# Patient Record
Sex: FEMALE | Race: OTHER | NOT HISPANIC OR LATINO
[De-identification: names, ages, dates, MRNs, and addresses within clinical notes are randomized per-mention and may not be internally consistent; named-entity substitution may affect disease eponyms.]

---

## 2023-08-15 ENCOUNTER — NON-APPOINTMENT (OUTPATIENT)
Age: 28
End: 2023-08-15

## 2023-08-15 ENCOUNTER — APPOINTMENT (OUTPATIENT)
Dept: GYNECOLOGIC ONCOLOGY | Facility: CLINIC | Age: 28
End: 2023-08-15
Payer: COMMERCIAL

## 2023-08-15 VITALS
SYSTOLIC BLOOD PRESSURE: 116 MMHG | HEIGHT: 69 IN | BODY MASS INDEX: 19.4 KG/M2 | WEIGHT: 131 LBS | DIASTOLIC BLOOD PRESSURE: 79 MMHG | HEART RATE: 82 BPM | TEMPERATURE: 97.8 F | OXYGEN SATURATION: 99 %

## 2023-08-15 DIAGNOSIS — D21.9 BENIGN NEOPLASM OF CONNECTIVE AND OTHER SOFT TISSUE, UNSPECIFIED: ICD-10-CM

## 2023-08-15 PROBLEM — Z00.00 ENCOUNTER FOR PREVENTIVE HEALTH EXAMINATION: Status: ACTIVE | Noted: 2023-08-15

## 2023-08-15 PROCEDURE — 99205 OFFICE O/P NEW HI 60 MIN: CPT

## 2023-08-15 NOTE — ASSESSMENT
[FreeTextEntry1] : Imaging studies and pathology report reviewed in detail. We discussed leiomyoma, atypical leiomyoma, STUMP and sarcomas. Characteristics of each explained.   I would not recommend a myomectomy at this time for oncologic reasons. I explained that in cases concerning for a sarcoma, hysterectomy is recommended. In the absence of a hysterectomy, I typically recommend close observation in this setting. I will first request the pathology slides from her hysteroscopic myomectomy and if no concern for STUMP or sarcoma, I recommend repeat US in 6 months.  Recommend that she follow up with Dr. Burns for management of menorrhagia. Medical management options briefly discussed. In addition, I will refer her to hematology for bleeding work up.   [] Pathology review [] Hematology Referral [] Repeat US in 6 months [] Recommend continuous OCPs or Mirena IUD  Reconsult as needed

## 2023-08-15 NOTE — HISTORY OF PRESENT ILLNESS
[FreeTextEntry1] : Problem:   Previous Therapies: 1) TVUS 7/11/23     a) Uterus 7.8 x 6.1 x 5.8cm with Mid fundal anterior intramural fibroid 3.1 x 2.9 x 2.9cm     b) Trace fluid in upper endometrium 2mm in thickness     c) EMS 0.8cm     d) RO 2.7cm; LO 2.2cm  2) Hysteroscopic myomectomy     a) Smooth muscle-scattered enlarged hyperchromatic, no mitotic activity w/ bizarre nuclei

## 2023-08-15 NOTE — PHYSICAL EXAM
[Abnormal] : Uterus: Abnormal [Normal] : Anus and perineum: Normal sphincter tone, no masses, no prolapse. [de-identified] : 7cm uterus, 2 cm irregular mass anterior uterus

## 2023-08-15 NOTE — CHIEF COMPLAINT
[FreeTextEntry1] : New Consult  29yo referred by Dr. Burns for h/o atypical fibroid. Pt has h/o menorrhagia leading to anemia, no IV iron or blood transfusions in 2017. At that time, she had a myosure resection of intracavitary fibroid which improved the vb. Recently she has been having heavy menses in past 3-4 months- ie. today on menses and used 2 super tampons and a pad this morning (this is her heaviest day). + cramps. Because of the increase vb she got an US and which shows the fibroid came back, similar size as in 2017.  PAP: NEG, HPV Neg 7/3/23 OBHX: none GYNHX: regular menses, on OCP,  h/o menorrhagia causing anemia s/p myosure resection of intracavitary fibroid 2017 c/w atypical myoma in [place]. denies abnormal paps.  PMHX: none SX: Hysteroscopic myomectomy 2017  MED: OCP- [facility] ALL: NKDA  SOCIAL: no toxic habits FAM: none

## 2023-08-25 ENCOUNTER — RESULT REVIEW (OUTPATIENT)
Age: 28
End: 2023-08-25

## 2023-08-25 ENCOUNTER — OUTPATIENT (OUTPATIENT)
Dept: OUTPATIENT SERVICES | Facility: HOSPITAL | Age: 28
LOS: 1 days | End: 2023-08-25
Payer: COMMERCIAL

## 2023-08-25 DIAGNOSIS — D21.9 BENIGN NEOPLASM OF CONNECTIVE AND OTHER SOFT TISSUE, UNSPECIFIED: ICD-10-CM

## 2023-08-25 PROCEDURE — 88321 CONSLTJ&REPRT SLD PREP ELSWR: CPT

## 2023-08-26 LAB
SURGICAL PATHOLOGY STUDY: SIGNIFICANT CHANGE UP

## 2024-09-30 ENCOUNTER — NON-APPOINTMENT (OUTPATIENT)
Age: 29
End: 2024-09-30

## 2024-10-01 ENCOUNTER — NON-APPOINTMENT (OUTPATIENT)
Age: 29
End: 2024-10-01

## 2024-10-01 ENCOUNTER — APPOINTMENT (OUTPATIENT)
Dept: GYNECOLOGIC ONCOLOGY | Facility: CLINIC | Age: 29
End: 2024-10-01
Payer: COMMERCIAL

## 2024-10-01 VITALS
OXYGEN SATURATION: 98 % | HEART RATE: 105 BPM | DIASTOLIC BLOOD PRESSURE: 85 MMHG | HEIGHT: 69 IN | BODY MASS INDEX: 19.26 KG/M2 | TEMPERATURE: 98 F | SYSTOLIC BLOOD PRESSURE: 125 MMHG | WEIGHT: 130 LBS

## 2024-10-01 DIAGNOSIS — R87.810 CERVICAL HIGH RISK HUMAN PAPILLOMAVIRUS (HPV) DNA TEST POSITIVE: ICD-10-CM

## 2024-10-01 LAB
HCG UR QL: NEGATIVE
QUALITY CONTROL: YES

## 2024-10-01 PROCEDURE — 81025 URINE PREGNANCY TEST: CPT

## 2024-10-01 PROCEDURE — 57454 BX/CURETT OF CERVIX W/SCOPE: CPT

## 2024-10-01 NOTE — CHIEF COMPLAINT
[FreeTextEntry1] : New Consult  27yo referred by Dr. Burns for h/o atypical fibroid. Pt has h/o menorrhagia leading to anemia, no IV iron or blood transfusions in 2017. At that time, she had a myosure resection of intracavitary fibroid which improved the vb. Recently she has been having heavy menses in past 3-4 months- ie. today on menses and used 2 super tampons and a pad this morning (this is her heaviest day). + cramps. Because of the increase vb she got an US and which shows the fibroid came back, similar size as in 2017.  PAP: NEG, HPV Neg 7/3/23 OBHX: none GYNHX: regular menses, on OCP,  h/o menorrhagia causing anemia s/p myosure resection of intracavitary fibroid 2017 c/w atypical myoma in [place]. denies abnormal paps.  PMHX: none SX: Hysteroscopic myomectomy 2017  MED: OCP- [facility] ALL: NKDA  SOCIAL: no toxic habits FAM: none

## 2024-10-01 NOTE — ASSESSMENT
[FreeTextEntry1] : ASCCP guidelines discussed  Patient tolerated procedure well. 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  [] ECC [] Cervical bx @ 11   Plan pending results

## 2024-10-01 NOTE — PROCEDURE
[Colposcopy] : colposcopy [HPV high risk] : PCR positive for high risk HPV [Patient] : the patient [Verbal Consent] : verbal consent was obtained prior to the procedure and is detailed in the patient's record [Cervical Pap Performed] : a cervical pap smear was not performed [Vaginal Pap Performed] : no vaginal pap smear was performed [SCJ Fully Visualized] : the squamocolumnar junction was fully visualized [No Abnormalities] : no abnormalities [Acetowhite ___ o'clock] : ascetowhite changes at the [unfilled] ~Uo'clock position [Biopsies Taken: # ___] : [unfilled] biopsies taken of the cervix [ECC Done] : an Endocervical curettage was performed.  [Silver Nitrate] : silver nitrate [No Complications] : none [de-identified] : uCG negative

## 2024-10-01 NOTE — REASON FOR VISIT
[FreeTextEntry1] : Follow-up  30yo re-referred by Dr. Burns for colposcopy for HPV 18+, PAP neg. LMP: on OCP and skipped the placebo. Denies dyspareunia, AUB, changes urinary or bowel habits.   OBHX: none GYNHX: regular menses, on OCP, h/o menorrhagia causing anemia s/p myosure resection of intracavitary fibroid 2017 c/w atypical myoma in [place]. denies abnormal paps.  PMHX: none SX: Hysteroscopic myomectomy 2017  MED: OCP- [facility] ALL: NKDA  SOCIAL: no toxic habits FAM: none

## 2024-10-01 NOTE — PROCEDURE
[Colposcopy] : colposcopy [HPV high risk] : PCR positive for high risk HPV [Patient] : the patient [Verbal Consent] : verbal consent was obtained prior to the procedure and is detailed in the patient's record [Cervical Pap Performed] : a cervical pap smear was not performed [Vaginal Pap Performed] : no vaginal pap smear was performed [SCJ Fully Visualized] : the squamocolumnar junction was fully visualized [No Abnormalities] : no abnormalities [Acetowhite ___ o'clock] : ascetowhite changes at the [unfilled] ~Uo'clock position [Biopsies Taken: # ___] : [unfilled] biopsies taken of the cervix [ECC Done] : an Endocervical curettage was performed.  [Silver Nitrate] : silver nitrate [No Complications] : none [de-identified] : uCG negative

## 2024-10-01 NOTE — HISTORY OF PRESENT ILLNESS
[FreeTextEntry1] : Problem: 1) fibroid 2) HPV 18+  Previous Therapies: 1) TVUS 7/11/23     a) Uterus 7.8 x 6.1 x 5.8cm with Mid fundal anterior intramural fibroid 3.1 x 2.9 x 2.9cm     b) Trace fluid in upper endometrium 2mm in thickness     c) EMS 0.8cm     d) RO 2.7cm; LO 2.2cm  2) Hysteroscopic myomectomy     a) Smooth muscle-scattered enlarged hyperchromatic, no mitotic activity w/ bizarre nuclei Path review c/w no concerning features  3) PAP neg; HPV 18 pos 8/15/24

## 2024-10-04 ENCOUNTER — NON-APPOINTMENT (OUTPATIENT)
Age: 29
End: 2024-10-04

## 2024-10-04 LAB — CORE LAB BIOPSY: NORMAL
